# Patient Record
Sex: FEMALE | Race: WHITE | NOT HISPANIC OR LATINO
[De-identification: names, ages, dates, MRNs, and addresses within clinical notes are randomized per-mention and may not be internally consistent; named-entity substitution may affect disease eponyms.]

---

## 2018-04-02 ENCOUNTER — FORM ENCOUNTER (OUTPATIENT)
Age: 83
End: 2018-04-02

## 2018-04-03 ENCOUNTER — APPOINTMENT (OUTPATIENT)
Dept: ORTHOPEDIC SURGERY | Facility: CLINIC | Age: 83
End: 2018-04-03
Payer: MEDICARE

## 2018-04-03 ENCOUNTER — OUTPATIENT (OUTPATIENT)
Dept: OUTPATIENT SERVICES | Facility: HOSPITAL | Age: 83
LOS: 1 days | End: 2018-04-03
Payer: MEDICARE

## 2018-04-03 VITALS — HEIGHT: 64 IN | BODY MASS INDEX: 25.95 KG/M2 | WEIGHT: 152 LBS

## 2018-04-03 DIAGNOSIS — M47.816 SPONDYLOSIS W/OUT MYELOPATHY OR RADICULOPATHY, LUMBAR REGION: ICD-10-CM

## 2018-04-03 DIAGNOSIS — M70.62 TROCHANTERIC BURSITIS, RIGHT HIP: ICD-10-CM

## 2018-04-03 DIAGNOSIS — M70.61 TROCHANTERIC BURSITIS, RIGHT HIP: ICD-10-CM

## 2018-04-03 PROCEDURE — 73521 X-RAY EXAM HIPS BI 2 VIEWS: CPT | Mod: 26

## 2018-04-03 PROCEDURE — 72020 X-RAY EXAM OF SPINE 1 VIEW: CPT | Mod: 26

## 2018-04-03 PROCEDURE — 20610 DRAIN/INJ JOINT/BURSA W/O US: CPT | Mod: RT

## 2018-04-03 PROCEDURE — 99203 OFFICE O/P NEW LOW 30 MIN: CPT | Mod: 25

## 2018-04-03 PROCEDURE — 73521 X-RAY EXAM HIPS BI 2 VIEWS: CPT

## 2018-04-03 PROCEDURE — 72020 X-RAY EXAM OF SPINE 1 VIEW: CPT

## 2018-04-25 ENCOUNTER — APPOINTMENT (OUTPATIENT)
Dept: PHYSICAL MEDICINE AND REHAB | Facility: CLINIC | Age: 83
End: 2018-04-25

## 2018-05-01 ENCOUNTER — MOBILE ON CALL (OUTPATIENT)
Age: 83
End: 2018-05-01

## 2018-05-09 ENCOUNTER — APPOINTMENT (OUTPATIENT)
Dept: PHYSICAL MEDICINE AND REHAB | Facility: CLINIC | Age: 83
End: 2018-05-09

## 2018-07-17 ENCOUNTER — APPOINTMENT (OUTPATIENT)
Dept: ORTHOPEDIC SURGERY | Facility: CLINIC | Age: 83
End: 2018-07-17
Payer: MEDICARE

## 2018-07-17 VITALS — WEIGHT: 152 LBS | HEIGHT: 64 IN | RESPIRATION RATE: 16 BRPM | BODY MASS INDEX: 25.95 KG/M2

## 2018-07-17 DIAGNOSIS — Z00.00 ENCOUNTER FOR GENERAL ADULT MEDICAL EXAMINATION W/OUT ABNORMAL FINDINGS: ICD-10-CM

## 2018-07-17 PROCEDURE — 99213 OFFICE O/P EST LOW 20 MIN: CPT

## 2018-07-17 PROCEDURE — 99203 OFFICE O/P NEW LOW 30 MIN: CPT

## 2018-07-17 PROCEDURE — 73130 X-RAY EXAM OF HAND: CPT | Mod: RT

## 2018-07-17 RX ORDER — COLLAGENASE CLOSTRIDIUM HISTOLYTICUM 0.9 MG
0.9 KIT INJECTION
Qty: 1 | Refills: 0 | Status: ACTIVE | COMMUNITY
Start: 2018-07-17 | End: 1900-01-01

## 2018-09-24 ENCOUNTER — APPOINTMENT (OUTPATIENT)
Dept: ORTHOPEDIC SURGERY | Facility: CLINIC | Age: 83
End: 2018-09-24
Payer: MEDICARE

## 2018-09-24 PROCEDURE — 99214 OFFICE O/P EST MOD 30 MIN: CPT | Mod: 25

## 2018-09-24 PROCEDURE — 20527 NJX NZM PALMAR FASCIAL CORD: CPT | Mod: RT

## 2018-09-25 ENCOUNTER — APPOINTMENT (OUTPATIENT)
Dept: ORTHOPEDIC SURGERY | Facility: CLINIC | Age: 83
End: 2018-09-25
Payer: MEDICARE

## 2018-09-25 PROCEDURE — 26341 MANIPULAT PALM CORD POST INJ: CPT | Mod: RT

## 2018-11-05 ENCOUNTER — APPOINTMENT (OUTPATIENT)
Dept: ORTHOPEDIC SURGERY | Facility: CLINIC | Age: 83
End: 2018-11-05
Payer: MEDICARE

## 2018-11-05 DIAGNOSIS — M72.0 PALMAR FASCIAL FIBROMATOSIS [DUPUYTREN]: ICD-10-CM

## 2018-11-05 PROCEDURE — 99214 OFFICE O/P EST MOD 30 MIN: CPT

## 2019-03-18 ENCOUNTER — APPOINTMENT (OUTPATIENT)
Dept: HEART AND VASCULAR | Facility: CLINIC | Age: 84
End: 2019-03-18
Payer: MEDICARE

## 2019-03-18 VITALS
DIASTOLIC BLOOD PRESSURE: 62 MMHG | TEMPERATURE: 97.7 F | RESPIRATION RATE: 16 BRPM | BODY MASS INDEX: 25.95 KG/M2 | WEIGHT: 152 LBS | OXYGEN SATURATION: 95 % | HEIGHT: 64 IN | SYSTOLIC BLOOD PRESSURE: 100 MMHG | HEART RATE: 65 BPM

## 2019-03-18 DIAGNOSIS — R42 DIZZINESS AND GIDDINESS: ICD-10-CM

## 2019-03-18 DIAGNOSIS — R53.83 OTHER FATIGUE: ICD-10-CM

## 2019-03-18 DIAGNOSIS — Z78.9 OTHER SPECIFIED HEALTH STATUS: ICD-10-CM

## 2019-03-18 DIAGNOSIS — I34.0 NONRHEUMATIC MITRAL (VALVE) INSUFFICIENCY: ICD-10-CM

## 2019-03-18 PROCEDURE — 93000 ELECTROCARDIOGRAM COMPLETE: CPT

## 2019-03-18 PROCEDURE — 99204 OFFICE O/P NEW MOD 45 MIN: CPT

## 2019-03-18 PROCEDURE — 93306 TTE W/DOPPLER COMPLETE: CPT

## 2019-03-18 NOTE — REVIEW OF SYSTEMS
[see HPI] : see HPI [Negative] : Respiratory [Shortness Of Breath] : no shortness of breath [Dyspnea on exertion] : not dyspnea during exertion [Chest  Pressure] : no chest pressure [Chest Pain] : no chest pain [Lower Ext Edema] : no extremity edema [Leg Claudication] : no intermittent leg claudication [Palpitations] : no palpitations

## 2019-03-18 NOTE — HISTORY OF PRESENT ILLNESS
[FreeTextEntry1] : 83 year female with fatigue. She describes having surgery for acoustic neuroma at [facility] years ago. She has a history of ovarian cancer. She reports an increase in dizziness that she feels is worsening. She is stretching bike and weight 3-4 times a week at the gym without and dizziness or chest pain. If she does not go to the gym, then she feels fatigue. She gets an occasional left leg numbness and stiffness. She denies having any hypertension or hyperlipidemia

## 2019-03-18 NOTE — DISCUSSION/SUMMARY
[FreeTextEntry1] : At the time of the patient's visit an Echocardiogram was performed to evaluate her LV function. \par \par At the time of the visit the results were reviewed with patient \par \par I informed the patient that I did not find a Cardiovascular cause for her symptoms

## 2019-03-18 NOTE — PHYSICAL EXAM
[General Appearance - Well Developed] : well developed [Normal Appearance] : normal appearance [Well Groomed] : well groomed [General Appearance - Well Nourished] : well nourished [No Deformities] : no deformities [General Appearance - In No Acute Distress] : no acute distress [Normal Conjunctiva] : the conjunctiva exhibited no abnormalities [Heart Rate And Rhythm] : heart rate and rhythm were normal [Heart Sounds] : normal S1 and S2 [Murmurs] : no murmurs present [] : no respiratory distress [Respiration, Rhythm And Depth] : normal respiratory rhythm and effort [Exaggerated Use Of Accessory Muscles For Inspiration] : no accessory muscle use [Auscultation Breath Sounds / Voice Sounds] : lungs were clear to auscultation bilaterally [Bowel Sounds] : normal bowel sounds [Abdomen Soft] : soft [Abnormal Walk] : normal gait [Skin Turgor] : normal skin turgor [Oriented To Time, Place, And Person] : oriented to person, place, and time [Affect] : the affect was normal [Mood] : the mood was normal [No Anxiety] : not feeling anxious [FreeTextEntry1] : no edema